# Patient Record
Sex: MALE | URBAN - METROPOLITAN AREA
[De-identification: names, ages, dates, MRNs, and addresses within clinical notes are randomized per-mention and may not be internally consistent; named-entity substitution may affect disease eponyms.]

---

## 2024-04-23 DIAGNOSIS — M25.551 RIGHT HIP PAIN: Primary | ICD-10-CM

## 2024-04-23 DIAGNOSIS — M79.605 LEFT LEG PAIN: ICD-10-CM

## 2024-07-18 ENCOUNTER — OFFICE VISIT (OUTPATIENT)
Age: 39
End: 2024-07-18
Payer: MEDICAID

## 2024-07-18 VITALS — WEIGHT: 178 LBS | HEIGHT: 71 IN | BODY MASS INDEX: 24.92 KG/M2

## 2024-07-18 DIAGNOSIS — G89.29 CHRONIC PAIN OF LEFT KNEE: Primary | ICD-10-CM

## 2024-07-18 DIAGNOSIS — M79.605 LEFT LEG PAIN: ICD-10-CM

## 2024-07-18 DIAGNOSIS — M70.71 BURSITIS OF RIGHT HIP, UNSPECIFIED BURSA: ICD-10-CM

## 2024-07-18 DIAGNOSIS — M25.551 RIGHT HIP PAIN: ICD-10-CM

## 2024-07-18 DIAGNOSIS — M25.562 CHRONIC PAIN OF LEFT KNEE: Primary | ICD-10-CM

## 2024-07-18 PROCEDURE — 99203 OFFICE O/P NEW LOW 30 MIN: CPT | Performed by: ORTHOPAEDIC SURGERY

## 2024-07-18 RX ORDER — DICLOFENAC SODIUM 75 MG/1
TABLET, DELAYED RELEASE ORAL
COMMUNITY
Start: 2024-04-22

## 2024-07-18 RX ORDER — DULOXETIN HYDROCHLORIDE 30 MG/1
CAPSULE, DELAYED RELEASE ORAL
COMMUNITY
Start: 2024-07-03

## 2024-07-18 NOTE — PATIENT INSTRUCTIONS
as instructed.  Follow your doctor's instructions about activity during your healing process. If you can do mild exercise, slowly increase your activity.  Stay at a healthy weight. Extra weight can strain the joints, especially the knees and hips, and make the pain worse. Losing a few pounds may help.  When should you call for help?   Call 911 anytime you think you may need emergency care. For example, call if:    You have symptoms of a blood clot in your lung (called a pulmonary embolism). These may include:  Sudden chest pain.  Trouble breathing.  Coughing up blood.   Call your doctor now or seek immediate medical care if:    You have severe or increasing pain.     Your leg or foot turns cold or changes color.     You cannot stand or put weight on your knee.     Your knee looks twisted or bent out of shape.     You cannot move your knee.     You have signs of infection, such as:  Increased pain, swelling, warmth, or redness.  Red streaks leading from the knee.  Pus draining from a place on your knee.  A fever.     You have signs of a blood clot in your leg (called a deep vein thrombosis), such as:  Pain in your calf, back of the knee, thigh, or groin.  Redness and swelling in your leg or groin.   Watch closely for changes in your health, and be sure to contact your doctor if:    You have tingling, weakness, or numbness in your knee.     You have any new symptoms, such as swelling.     You have bruises from a knee injury that last longer than 2 weeks.     You do not get better as expected.   Where can you learn more?  Go to https://www.[email or web address].net/patientEd and enter K195 to learn more about \"Knee Pain or Injury: Care Instructions.\"  Current as of: March 9, 2022               Content Version: 13.5  © 9330-4753 [employer].   Care instructions adapted under license by [employer]. If you have questions about a medical condition or this instruction, always ask your healthcare professional.

## 2024-07-18 NOTE — PROGRESS NOTES
the greater trochanter.     Left Hip - Normal range of motion, No crepitation, Grossly neurovascularly intact, Good cap refill, No skin lesion, mild swelling, No gross instability, No weakness, No groin pain, No point tenderness on the greater trochanter.    Encounter Diagnoses   Name Primary?    Chronic pain of left knee Yes    Bursitis of right hip, unspecified bursa          HPI:  The patient is here with a chief complaint of left leg pain and right hip pain, throbbing, burning pain.  Pain is 7/10.    X-rays of the right hip are positive for a troch nail that was placed.  Also, x-ray of the left leg is positive for possible old osteomyelitis and also looks like he probably had an external fixator from a tibial fracture.  He had a VAC dressing as well.  He has been through polytrauma.    Assessment/Plan:  Plan at this point, I would like to get him set up with [facility] for polytrauma issues.  We will go from there.  If he gets worse, he is to give me a call.    As part of continued conservative pain management options the patient was advised to utilize Tylenol or OTC NSAIDS as long as it is not medically contraindicated.     Return to Office:    Follow-up and Dispositions    Return if symptoms worsen or fail to improve.        Scribed by Igor Rdz MD as dictated by Igor Rdz MD.  Documentation, performed by, True and Accepted Igor Rdz MD

## 2024-07-23 DIAGNOSIS — M79.605 LEFT LEG PAIN: ICD-10-CM

## 2024-07-23 DIAGNOSIS — M25.551 RIGHT HIP PAIN: Primary | ICD-10-CM

## 2024-07-26 ENCOUNTER — TELEPHONE (OUTPATIENT)
Age: 39
End: 2024-07-26

## 2024-07-26 NOTE — TELEPHONE ENCOUNTER
Pt was seen on 7-18-24 and was referred out to [facility]. Pt was trying to get food stamps and they need a note stating he is unable to work right now due to his injuries. He has made an appointment with their office on 7-31-24. Are we able to write him this note until he has his first appointment with the other office.         Fax- [phone]  
24